# Patient Record
Sex: FEMALE | Employment: UNEMPLOYED | URBAN - METROPOLITAN AREA
[De-identification: names, ages, dates, MRNs, and addresses within clinical notes are randomized per-mention and may not be internally consistent; named-entity substitution may affect disease eponyms.]

---

## 2022-05-17 PROBLEM — G56.02 CARPAL TUNNEL SYNDROME ON LEFT: Status: ACTIVE | Noted: 2021-04-27

## 2022-05-17 PROBLEM — R82.994 HYPERCALCIURIA: Status: ACTIVE | Noted: 2021-05-10

## 2022-05-17 PROBLEM — N25.9 IMPAIRED RENAL FUNCTION DISORDER: Status: ACTIVE | Noted: 2021-05-10

## 2022-06-21 ENCOUNTER — OFFICE VISIT (OUTPATIENT)
Dept: ORTHOPEDIC SURGERY | Age: 52
End: 2022-06-21
Payer: COMMERCIAL

## 2022-06-21 DIAGNOSIS — M17.12 PRIMARY OSTEOARTHRITIS OF LEFT KNEE: Primary | ICD-10-CM

## 2022-06-21 DIAGNOSIS — M17.11 PRIMARY OSTEOARTHRITIS OF RIGHT KNEE: ICD-10-CM

## 2022-06-21 PROCEDURE — 20610 DRAIN/INJ JOINT/BURSA W/O US: CPT | Performed by: ORTHOPAEDIC SURGERY

## 2022-06-21 NOTE — PROGRESS NOTES
Name: Miky Room  YOB: 1970  Gender: female  MRN: [id number]    Not on File    CC:  bilateral knee pain, Osteoarthritis    PROCEDURE: HA injection(s). All questions answered. Procedure Note: The patient was placed in upright position with both knees hanging freely from exam table. Both knees were prepped in sterile fashion using alcohol wipe(s). Using an infrapatellar approach, 3cc of Durolane was injected freely. The needle was then removed, pressure hemostatis achieved, injection site was cleansed with alcohol wipe and dressed with band aid. The patient tolerated the procedure without complication. The patient  will follow up as scheduled.     Dallas Amaro MD  06/21/22

## 2022-11-30 NOTE — PROGRESS NOTES
[facility] CARDIOLOGY  [address], [address]  PHONE: [phone]      22    NAME:  Brandon Mercado  : 1970  MRN: [id number]         SUBJECTIVE:   Brandon Mercado is a 45 y.o. female seen for a consultation visit regarding the following:     Chief Complaint   Patient presents with    Hypertension            HPI:  Consultation is requested by the patient for evaluation of Hypertension   . Returns for a check up, per patient. says she is doing fine, exercises at cross fit without cp, dyspnea, palpitations. She had covid and had some elevated BP around that time but has been much better. Past Medical History, Past Surgical History, Family history, Social History, and Medications were all reviewed with the patient today and updated as necessary. Prior to Admission medications    Medication Sig Start Date End Date Taking?  Authorizing Provider   metoprolol succinate (TOPROL XL) 50 MG extended release tablet Take 50 mg by mouth daily 22  Yes Historical Provider, MD   celecoxib (CELEBREX) 100 MG capsule Take 100 mg by mouth 2 times daily 11/3/22  Yes Historical Provider, MD   methocarbamol (ROBAXIN) 500 MG tablet Take 500 mg by mouth 2 times daily 11/3/22  Yes Historical Provider, MD   ergocalciferol (ERGOCALCIFEROL) 1.25 MG (50130 UT) capsule Take 50,000 Units by mouth once a week 22  Yes Historical Provider, MD   vitamin D 25 MCG (1000 UT) CAPS Take by mouth daily   Yes Ar Automatic Reconciliation   acetaminophen (TYLENOL) 325 MG tablet Take 352-650 mg by mouth every 6 hours as needed    Historical Provider, MD     No Known Allergies  Past Medical History:   Diagnosis Date    Kidney stones      Past Surgical History:   Procedure Laterality Date    KNEE ARTHROSCOPY      TUBAL LIGATION       Family History   Problem Relation Age of Onset    Pacemaker Mother      Social History     Tobacco Use    Smoking status: Never    Smokeless tobacco: Never Substance Use Topics    Alcohol use: Not Currently       ROS:    Review of Systems   Cardiovascular:  Negative for chest pain and palpitations. Respiratory:  Negative for shortness of breath. PHYSICAL EXAM:   /84   Pulse 66   Ht 5' 4\" (1.626 m)   Wt 156 lb (70.8 kg)   BMI 26.78 kg/m²      Physical Exam  Constitutional:       Appearance: Normal appearance. HENT:      Head: Normocephalic and atraumatic. Eyes:      Conjunctiva/sclera: Conjunctivae normal.   Neck:      Vascular: No carotid bruit. Cardiovascular:      Rate and Rhythm: Normal rate and regular rhythm. Heart sounds: No murmur heard. No friction rub. No gallop. Pulmonary:      Effort: No respiratory distress. Breath sounds: No wheezing or rales. Musculoskeletal:         General: No swelling. Cervical back: Neck supple. Skin:     General: Skin is warm and dry. Neurological:      General: No focal deficit present. Mental Status: She is alert. Psychiatric:         Mood and Affect: Mood normal.         Behavior: Behavior normal.       Medical problems and test results were reviewed with the patient today.      DATA REVIEW     4/25/22 1415    WBC 3.5 - 10.8 K/uL 10.0    RBC 3.86 - 5.35 M/uL 4.71    Hemoglobin 11.0 - 15.4 g/dL 13.3    Hematocrit 35.6 - 47.3 % 40.9    MCV 79.0 - 100.0 fL 86.8    MCH 23.7 - 32.9 pg 28.3    MCHC 30.0 - 36.5 g/dL 32.6    RDW-CV 11.6 - 16.0 % 13.6    Platelets 163 - 370 K/uL 294       4/25/22 1415    Hemoglobin A1C <5.7 % 5.6      4/25/22 1415     Sodium 136 - 145 mmol/L 139    Potassium 3.5 - 5.1 mmol/L 4.1    Chloride 98 - 107 mmol/L 107    CO2 20 - 30 mmol/L 22    Anion Gap 6 - 16 mmol/L 10    BUN 7 - 20 mg/dL 13    Creatinine 0.57 - 1.11 mg/dL 0.67    Glucose 70 - 99 mg/dL 96    Calcium 8.5 - 10.4 mg/dL 9.9    AST 5 - 34 IU/L 23    ALT <55 IU/L 23    Alkaline Phosphatase 40 - 150 IU/L 96    Protein, Total 6.2 - 8.3 g/dL 8.0    Albumin 3.5 - 5.0 g/dL 4.0    Bilirubin, Total 0.1 - 1.2 mg/dL 0.4    eGFR >59 mL/min/1.73 m2 >90      4/25/22 1415     Magnesium 1.6 - 2.6 mg/dL 2.0      4/25/22 1415    TSH 0.350 - 4.940 uIU/mL 2.175            Cholesterol   Triglycerides   HDL Cholesterol   VLDL Cholesterol   LDL, Calculated   Chol/HDL Ratio     Component 08/04/2022       Cholesterol 219 High       Triglycerides 119   HDL Cholesterol 52   VLDL Cholesterol 21   LDL, Calculated 146 High       Chol/HDL Ratio 4.2       EKG    Sinus  Rhythm 66  normal axis, intervals, ST and T waves    CXR/IMAGING        DEVICE INTERROGATION        OUTSIDE RECORDS REVIEW    Records from outside providers have been reviewed and summarized as noted in the HPI, past history and data review sections of this note, and reviewed with patient. .       ASSESSMENT and PLAN    Ravi Hunter was seen today for hypertension. Diagnoses and all orders for this visit:    Essential hypertension  -     EKG 12 Lead    Dyslipidemia        IMPRESSION:    Healthy, asymptomatic patient, continue risk modification, current meds appear to control BP, manage lipids with diet and exercise. Return if symptoms worsen or fail to improve. Thank you for allowing me to participate in this patient's care. Please call or contact me if there are any questions or concerns regarding the above.       Darwin Mckeon MD  12/01/22  8:39 AM

## 2022-12-01 ENCOUNTER — OFFICE VISIT (OUTPATIENT)
Dept: CARDIOLOGY CLINIC | Age: 52
End: 2022-12-01
Payer: COMMERCIAL

## 2022-12-01 VITALS
SYSTOLIC BLOOD PRESSURE: 138 MMHG | DIASTOLIC BLOOD PRESSURE: 84 MMHG | BODY MASS INDEX: 26.63 KG/M2 | HEIGHT: 64 IN | HEART RATE: 66 BPM | WEIGHT: 156 LBS

## 2022-12-01 DIAGNOSIS — I10 ESSENTIAL HYPERTENSION: Primary | ICD-10-CM

## 2022-12-01 DIAGNOSIS — E78.5 DYSLIPIDEMIA: ICD-10-CM

## 2022-12-01 PROCEDURE — G8484 FLU IMMUNIZE NO ADMIN: HCPCS | Performed by: INTERNAL MEDICINE

## 2022-12-01 PROCEDURE — 93000 ELECTROCARDIOGRAM COMPLETE: CPT | Performed by: INTERNAL MEDICINE

## 2022-12-01 PROCEDURE — 3017F COLORECTAL CA SCREEN DOC REV: CPT | Performed by: INTERNAL MEDICINE

## 2022-12-01 PROCEDURE — G8419 CALC BMI OUT NRM PARAM NOF/U: HCPCS | Performed by: INTERNAL MEDICINE

## 2022-12-01 PROCEDURE — 99212 OFFICE O/P EST SF 10 MIN: CPT | Performed by: INTERNAL MEDICINE

## 2022-12-01 PROCEDURE — G8427 DOCREV CUR MEDS BY ELIG CLIN: HCPCS | Performed by: INTERNAL MEDICINE

## 2022-12-01 PROCEDURE — 1036F TOBACCO NON-USER: CPT | Performed by: INTERNAL MEDICINE

## 2022-12-01 PROCEDURE — 3079F DIAST BP 80-89 MM HG: CPT | Performed by: INTERNAL MEDICINE

## 2022-12-01 PROCEDURE — 3075F SYST BP GE 130 - 139MM HG: CPT | Performed by: INTERNAL MEDICINE

## 2022-12-01 RX ORDER — ACETAMINOPHEN 325 MG/1
352-650 TABLET ORAL EVERY 6 HOURS PRN
COMMUNITY

## 2022-12-01 RX ORDER — ERGOCALCIFEROL (VITAMIN D2) 1250 MCG
50000 CAPSULE ORAL WEEKLY
COMMUNITY
Start: 2022-08-05

## 2022-12-01 RX ORDER — CELECOXIB 100 MG/1
100 CAPSULE ORAL 2 TIMES DAILY
COMMUNITY
Start: 2022-11-03

## 2022-12-01 RX ORDER — METHOCARBAMOL 500 MG/1
500 TABLET, FILM COATED ORAL 2 TIMES DAILY
COMMUNITY
Start: 2022-11-03

## 2022-12-01 RX ORDER — METOPROLOL SUCCINATE 50 MG/1
50 TABLET, EXTENDED RELEASE ORAL DAILY
COMMUNITY
Start: 2022-08-04

## 2022-12-01 ASSESSMENT — ENCOUNTER SYMPTOMS: SHORTNESS OF BREATH: 0
